# Patient Record
Sex: MALE | Race: BLACK OR AFRICAN AMERICAN | HISPANIC OR LATINO | URBAN - METROPOLITAN AREA
[De-identification: names, ages, dates, MRNs, and addresses within clinical notes are randomized per-mention and may not be internally consistent; named-entity substitution may affect disease eponyms.]

---

## 2023-09-22 ENCOUNTER — HOSPITAL ENCOUNTER (EMERGENCY)
Facility: HOSPITAL | Age: 18
Discharge: HOME | End: 2023-09-22
Attending: EMERGENCY MEDICINE

## 2023-09-22 VITALS
TEMPERATURE: 98 F | SYSTOLIC BLOOD PRESSURE: 128 MMHG | HEART RATE: 72 BPM | RESPIRATION RATE: 20 BRPM | OXYGEN SATURATION: 98 % | DIASTOLIC BLOOD PRESSURE: 59 MMHG

## 2023-09-22 DIAGNOSIS — R35.0 URINARY FREQUENCY: ICD-10-CM

## 2023-09-22 DIAGNOSIS — L29.3 ITCHING OF PENIS: ICD-10-CM

## 2023-09-22 DIAGNOSIS — Z71.1 CONCERN ABOUT STD IN MALE WITHOUT DIAGNOSIS: Primary | ICD-10-CM

## 2023-09-22 LAB
BACTERIA #/AREA URNS HPF: ABNORMAL /HPF
BILIRUB UR QL STRIP: NEGATIVE
CLARITY UR: CLEAR
COLOR UR: YELLOW
GLUCOSE UR QL STRIP: NEGATIVE
HGB UR QL STRIP: NEGATIVE
KETONES UR QL STRIP: NEGATIVE
LEUKOCYTE ESTERASE UR QL STRIP: ABNORMAL
MICROSCOPIC COMMENT: ABNORMAL
NITRITE UR QL STRIP: NEGATIVE
PH UR STRIP: 6 [PH] (ref 5–8)
PROT UR QL STRIP: ABNORMAL
RBC #/AREA URNS HPF: 4 /HPF (ref 0–4)
SP GR UR STRIP: 1.02 (ref 1–1.03)
SQUAMOUS #/AREA URNS HPF: 1 /HPF
URN SPEC COLLECT METH UR: ABNORMAL
UROBILINOGEN UR STRIP-ACNC: NEGATIVE EU/DL
WBC #/AREA URNS HPF: 27 /HPF (ref 0–5)

## 2023-09-22 PROCEDURE — 81000 URINALYSIS NONAUTO W/SCOPE: CPT

## 2023-09-22 PROCEDURE — 63600175 PHARM REV CODE 636 W HCPCS

## 2023-09-22 PROCEDURE — 87591 N.GONORRHOEAE DNA AMP PROB: CPT

## 2023-09-22 PROCEDURE — 25000003 PHARM REV CODE 250

## 2023-09-22 PROCEDURE — 87086 URINE CULTURE/COLONY COUNT: CPT

## 2023-09-22 PROCEDURE — 99284 EMERGENCY DEPT VISIT MOD MDM: CPT

## 2023-09-22 PROCEDURE — 87661 TRICHOMONAS VAGINALIS AMPLIF: CPT

## 2023-09-22 PROCEDURE — 96372 THER/PROPH/DIAG INJ SC/IM: CPT

## 2023-09-22 RX ORDER — CEFTRIAXONE 500 MG/1
500 INJECTION, POWDER, FOR SOLUTION INTRAMUSCULAR; INTRAVENOUS
Status: COMPLETED | OUTPATIENT
Start: 2023-09-22 | End: 2023-09-22

## 2023-09-22 RX ORDER — DOXYCYCLINE HYCLATE 100 MG
100 TABLET ORAL
Status: COMPLETED | OUTPATIENT
Start: 2023-09-22 | End: 2023-09-22

## 2023-09-22 RX ORDER — DOXYCYCLINE 100 MG/1
100 CAPSULE ORAL 2 TIMES DAILY
Qty: 13 CAPSULE | Refills: 0 | Status: SHIPPED | OUTPATIENT
Start: 2023-09-22 | End: 2023-09-29

## 2023-09-22 RX ADMIN — DOXYCYCLINE HYCLATE 100 MG: 100 TABLET, COATED ORAL at 08:09

## 2023-09-22 RX ADMIN — CEFTRIAXONE SODIUM 500 MG: 500 INJECTION, POWDER, FOR SOLUTION INTRAMUSCULAR; INTRAVENOUS at 08:09

## 2023-09-23 NOTE — ED PROVIDER NOTES
Encounter Date: 9/22/2023       History     Chief Complaint   Patient presents with    Male  Problem     Groin itching x 2-3 weeks.      Patient is a 18-year-old male with no past medical history who presents to the emergency department complaining of penile itching x 3 weeks.  Reports his partner with similar symptoms also being treated here.  Denies discharge from his penis.  Reports concern for STI, history of chlamydia.  Denies fever, chills.  Denies abdominal pain, n/v/d. Denies testicular pain.  Reports urinary frequency, but denies dysuria, hematuria, flank pain.  Denies headache, chest pain, shortness a breath.    The history is provided by the patient. A  was used ([id number]).     Review of patient's allergies indicates:  No Known Allergies  History reviewed. No pertinent past medical history.  History reviewed. No pertinent surgical history.  History reviewed. No pertinent family history.  Social History     Tobacco Use    Smoking status: Never    Smokeless tobacco: Never   Substance Use Topics    Drug use: Never     Review of Systems   Constitutional:  Negative for chills and fever.   Respiratory:  Negative for shortness of breath.    Cardiovascular:  Negative for chest pain.   Gastrointestinal:  Negative for abdominal pain, diarrhea, nausea and vomiting.   Genitourinary:  Positive for frequency. Negative for dysuria, flank pain, hematuria, penile discharge and testicular pain.        Penile itching   Neurological:  Negative for headaches.       Physical Exam     Initial Vitals [09/22/23 1813]   BP Pulse Resp Temp SpO2   (!) 128/59 72 20 98.4 °F (36.9 °C) 98 %      MAP       --         Physical Exam    Nursing note and vitals reviewed.  Constitutional: He appears well-developed and well-nourished.   HENT:   Head: Normocephalic and atraumatic.   Right Ear: External ear normal.   Left Ear: External ear normal.   Neck:   Normal range of motion.  Cardiovascular:  Normal rate, regular  rhythm, normal heart sounds and intact distal pulses.     Exam reveals no gallop and no friction rub.       No murmur heard.  Pulmonary/Chest: Breath sounds normal. No respiratory distress. He has no wheezes. He has no rhonchi. He has no rales.   Abdominal: Abdomen is soft. Bowel sounds are normal. He exhibits no distension. There is no abdominal tenderness. There is no rebound and no guarding.   Musculoskeletal:         General: Normal range of motion.      Cervical back: Normal range of motion.     Neurological: He is alert and oriented to person, place, and time. GCS score is 15. GCS eye subscore is 4. GCS verbal subscore is 5. GCS motor subscore is 6.   Psychiatric: He has a normal mood and affect.         ED Course   Procedures  Labs Reviewed   URINALYSIS, REFLEX TO URINE CULTURE - Abnormal; Notable for the following components:       Result Value    Protein, UA Trace (*)     Leukocytes, UA 1+ (*)     All other components within normal limits    Narrative:     Specimen Source->Urine   URINALYSIS MICROSCOPIC - Abnormal; Notable for the following components:    WBC, UA 27 (*)     Bacteria Few (*)     All other components within normal limits    Narrative:     Specimen Source->Urine   C. TRACHOMATIS/N. GONORRHOEAE BY AMP DNA   TRICHOMONAS VAGINALIS, RNA,QUAL, URINE   CULTURE, URINE          Imaging Results    None          Medications   cefTRIAXone injection 500 mg (500 mg Intramuscular Given 9/22/23 2048)   doxycycline tablet 100 mg (100 mg Oral Given 9/22/23 2048)     Medical Decision Making  This is an emergent evaluation of a 18-year-old male with no past medical history who presents to the emergency department complaining of penile itching x 3 weeks. Physical exam reveals Regular rate rhythm without murmurs.  Lungs are clear to auscultation bilaterally.  Abdomen is soft, nontender, non distended, with normal bowel sounds.  Differential diagnosis includes but is not limited to UTI, chlamydia, gonorrhea,  Trichomonas.  Workup initiated with UA, GC urine, Trichomonas urine. UA shows 1+ leukocytes, no nitrites. Patient states he would like to being empirically treated for gonorrhea and chlamydia given concern history.  Patient given 500 mg of IM Rocephin and 1 dose of doxycycline here in the emergency department.  Will send home with remaining course of doxycycline.  Instructed patient to refrain from engaging in sexual intercourse until results were back.  Informed patient he will receive a call if abnormal results. GC urine and Trichomonas urine pending.Patient is very well appearing, and in no acute distress. Vital signs are reassuring here in the emergency department, patient is afebrile, breathing comfortable, satting 98 % on room air. Patient is stable for discharge at this time. Patient verbalizes understanding of care plan. All questions and concerns were addressed. Discussed strict return precautions with the patient. Instructed follow up with primary care provider within 1 week.      Ko Espinosa PA-C    DISCLAIMER: This note was prepared with [employer] voice recognition transcription software. Garbled syntax, mangled pronouns, and other bizarre constructions may be attributed to that software system.     Amount and/or Complexity of Data Reviewed  Labs: ordered.    Risk  Prescription drug management.                               Clinical Impression:   Final diagnoses:  [Z71.1] Concern about STD in male without diagnosis (Primary)  [L29.3] Itching of penis  [R35.0] Urinary frequency        ED Disposition Condition    Discharge Stable          ED Prescriptions       Medication Sig Dispense Start Date End Date Auth. Provider    doxycycline (VIBRAMYCIN) 100 MG Cap Take 1 capsule (100 mg total) by mouth 2 (two) times daily. for 7 days 13 capsule 9/22/2023 9/29/2023 Ko Espinosa PA-C          Follow-up Information       Follow up With Specialties Details Why Contact Info    [facility] [place] - Emergency Dept Emergency  Medicine Go to  As needed, If symptoms worsen, or new symptoms develop [id number]  [facility] [id number] [phone]             Ko Espinosa PAShericeC  09/22/23 2116

## 2023-09-23 NOTE — DISCHARGE INSTRUCTIONS
Puede tory doxiciclina según lo recetado para el tratamiento empírico de la clamidia. Le dieron 1 dosis aquí en el departamento de emergencias, la próxima dosis será mañana por la mañana. Le administraron yuriy inyección de Rocephin aquí en el departamento de emergencias para el tratamiento empírico de la gonorrea. Recibirá yuriy llamada del servicio de urgencias si los resultados son anormales. Shawna un seguimiento con gamez proveedor de atención primaria dentro de 1 semana.  Hilaria por venir a nuestro Departamento de Emergencias hoy. Es importante recordar que algunos problemas son difíciles de diagnosticar y es posible que no se detecten maria elena gamez visita al Departamento de Emergencias. Asegúrese de hacer un seguimiento con gamez médico de atención primaria y revisar con él todos los laboratorios, imágenes y pruebas que se realizaron maria elena esta visita. Algunos laboratorios/pruebas pueden estar fuera del rango normal y requerir un seguimiento que no sea de emergencia y yuriy investigación adicional para ayudar a diagnosticar/excluir/prevenir complicaciones u otras afecciones médicas.    Si no tiene un médico de atención primaria, puede comunicarse con el que figura en gamez documentación de gabe o también puede llamar al mostrador de citas de la [facility] al [phone] para programar yuriy rajesh y establecer la atención con gabe. Es importante para gamez kevin que cuente con un médico de atención primaria.    [place] todos los medicamentos según las indicaciones. Todos los medicamentos pueden tener efectos secundarios y es imposible predecir qué medicamentos pueden provocarle efectos secundarios o qué efectos secundarios (si los hay) le provocarán. Si siente que está teniendo un efecto negativo o secundario, efecto de cualquier medicamento, debe dejar de tomarlo inmediatamente y buscar atención médica. Si siente que está teniendo yuriy reacción que pone en peligro gamez nikolai, llame al 911.    Regrese a la miguel de emergencias si  tiene alguna pregunta o inquietud, síntomas nuevos o preocupantes, si empeora o si no mejora.    No conduzca, nade, suba a [place], no se bañe ni tome decisiones importantes maria elena 24 horas si ha recibido algún analgésico, sedante o fármaco que altere el estado de ánimo maria elena gamez visita a urgencias.

## 2023-09-24 LAB — BACTERIA UR CULT: NORMAL

## 2023-09-25 LAB
C TRACH DNA SPEC QL NAA+PROBE: DETECTED
N GONORRHOEA DNA SPEC QL NAA+PROBE: NOT DETECTED
SPECIMEN SOURCE: NORMAL
T VAGINALIS RRNA SPEC QL NAA+PROBE: NEGATIVE